# Patient Record
Sex: FEMALE
[De-identification: names, ages, dates, MRNs, and addresses within clinical notes are randomized per-mention and may not be internally consistent; named-entity substitution may affect disease eponyms.]

---

## 2020-06-20 ENCOUNTER — NURSE TRIAGE (OUTPATIENT)
Dept: OTHER | Facility: CLINIC | Age: 45
End: 2020-06-20

## 2020-06-20 NOTE — TELEPHONE ENCOUNTER
Pt report she swallowed the valved on water bottle about an hour ago. Pt denies CP and SOB at this time. Pt is having no symptoms at this time but will give a call back if she develops any symptoms. Pt in agreement with care advice and disposition. Reason for Disposition   FB found in stools    Answer Assessment - Initial Assessment Questions  1. OBJECT: Estrella Painter is it? \"       Small plastic object off water bottle     2. SIZE: \"How large is it? \" (inches or cm, or compare it to standard coins)       1/2 inch     3. ONSET: \"How long ago did you swallow it? \" (e.g., minutes, hours)       1 hour ago     4. MECHANISM: \"Tell me how it happened. \"       Pt report that she was drinking water and noticed plastic in the water and accidentally swallowed the plastic     5. OTHER SYMPTOMS: Nathanel Scriver there any other symptoms? \" (e.g., pain in neck or chest, difficulty breathing, difficulty swallowing)      No    6. PREGNANCY: \"Is there any chance you are pregnant? \" \"When was your last menstrual period? \"    Protocols used: SWALLOWED FOREIGN BODY-ADULT-